# Patient Record
(demographics unavailable — no encounter records)

---

## 2024-10-30 NOTE — PHYSICAL EXAM
[de-identified] : Left shoulder exam   Inspection: There is no swelling or ecchymosis. Skin: There is a soft nontender freely mobile structure approximately 3 x 3 cm at the inferior margin of the scapula Tenderness: There is diffuse tenderness about the shoulder ROM: Passive forward elevation 140 degrees abduction 80 degrees he is able to maintain his arm in abduction.  External rotation 40 degrees  Neuro: AIN, PIN, Ulnar nerve motor intact Sensation: Intact to light touch in radial, median, ulnar, and axillary nerve distributions Vasc: 2+ radial pulse

## 2024-12-04 NOTE — HISTORY OF PRESENT ILLNESS
[de-identified] : 49-year-old male left shoulder pain 2 days.  He sustained an accident at work where he dislocated his shoulder.  He was seen in outside emergency room where he was closed reduced.  He has been in a sling and shoulder immobilizer since that time he reports pain in his shoulder.  He denies any prior shoulder injury.  He denies numbness tingling He presents today in a sling his pain is improving he denies numbness tingling.  He reports that the pain is improving he is working with physical therapy He is not out of his sling.  He is working physical therapy reports some soreness in his shoulder although this is improving. He is continue to work with physical therapy reports some soreness in his lateral shoulder reports some stiffness his pain is improving Pain posterior aspect of the shoulder around the scapular region.  This is new since last visit.  No new injuries  He is now 6 months post injury.  He does feel like he is improving although feels like he is improving slower than expected.  He complains of stiffness in his shoulder he feels like his left shoulder is weaker than the other side he does not feel like this is improved in the last month

## 2024-12-04 NOTE — DISCUSSION/SUMMARY
[de-identified] : 49-year-old male 6-month status post left shoulder greater tuberosity fracture.  The fracture is healed on x-ray however he is significant limitation in motion as well as strength.  He is very weak with resisted abduction.  It would be unusual to have a simultaneous rotator cuff injury with a greater tuberosity fracture.  The fracture did appear minimally displaced on radiographs but may be more posteriorly displaced than initially appreciated?  Will obtain a CT arthrogram at this time to assess fracture union as well as fracture position as well as status of the soft tissues including rotator cuff to further discuss additional options to try to help.  He will continue his physical therapy focusing on range of motion and strengthening.  He will follow-up after CT scan is obtained

## 2024-12-04 NOTE — PHYSICAL EXAM
[de-identified] : Left shoulder exam  	Inspection: No swelling, ecchymosis or gross deformity. 	Skin: No masses, No lesions 	Neck: Negative Spurlings, full ROM without pain 	Tenderness: No bicipital tenderness, no tenderness to the greater tuberosity/RTC insertion, no anterior shoulder/lesser tuberosity tenderness. No tenderness SC joint, clavicle, AC joint. 	ROM: Active forward elevation 1 to 20 degrees passive to 160 degrees abduction to 80 degrees external rotation 20 degrees internal rotation to L5 	Impingement tests: Negative Olivier, Negative Neer, no painful arc 	AC Joint: no pain with cross arm testing 	Biceps: Speed positive 	Strength: 4/5 abduction, 5/5 external rotation, and internal rotation 	Neuro: AIN, PIN, Ulnar nerve motor intact 	Sensation: Intact to light touch in radial, median, ulnar, and axillary nerve distributions 	Vasc: 2+ radial pulse  [de-identified] : The following radiographs were ordered and read by me during this patients visit. I reviewed each radiograph in detail with the patient and discussed the findings as highlighted below.  2 views left shoulder obtained today there is a healed greater tuberosity fracture and slight posterior positioning the glenohumeral joint is well-maintained

## 2025-02-25 NOTE — DISCUSSION/SUMMARY
[de-identified] : Healed greater tuberosity fracture with slight posterior displacement.  He has a corresponding slight decrease in external rotation.  At this point he is cleared for activities as he feels comfortable and is limited by his level of pain and stiffness.  He wishes to continue physical therapy.  He will follow-up for annual checkup.  All questions answered

## 2025-02-25 NOTE — PHYSICAL EXAM
[de-identified] : Left shoulder exam  	Inspection: No swelling, ecchymosis or gross deformity. 	Skin: No masses, No lesions 	Neck: Negative Spurlings, full ROM without pain 	Tenderness: No bicipital tenderness, no tenderness to the greater tuberosity/RTC insertion, no anterior shoulder/lesser tuberosity tenderness. No tenderness SC joint, clavicle, AC joint. 	ROM: Active forward elevation 150 degrees.  External rotation 20 IR T12 	Impingement tests: Negative Olivier, Negative Neer, no painful arc 	AC Joint: no pain with cross arm testing 	Biceps: Speed positive 	Strength: 4/5 abduction, 5/5 external rotation, and internal rotation 	Neuro: AIN, PIN, Ulnar nerve motor intact 	Sensation: Intact to light touch in radial, median, ulnar, and axillary nerve distributions 	Vasc: 2+ radial pulse  [de-identified] : CT scan reviewed.  CT arthrogram healed greater tuberosity fracture with slight posterior inferior displacement.  Minimal rotator cuff tendinopathy

## 2025-02-25 NOTE — HISTORY OF PRESENT ILLNESS
[de-identified] : 49-year-old male left shoulder pain 2 days.  He sustained an accident at work where he dislocated his shoulder.  He was seen in outside emergency room where he was closed reduced.  He has been in a sling and shoulder immobilizer since that time he reports pain in his shoulder.  He denies any prior shoulder injury.  He denies numbness tingling He presents today in a sling his pain is improving he denies numbness tingling.  He reports that the pain is improving he is working with physical therapy He is not out of his sling.  He is working physical therapy reports some soreness in his shoulder although this is improving. He is continue to work with physical therapy reports some soreness in his lateral shoulder reports some stiffness his pain is improving Pain posterior aspect of the shoulder around the scapular region.  This is new since last visit.  No new injuries  He is now 6 months post injury.  He does feel like he is improving although feels like he is improving slower than expected.  He complains of stiffness in his shoulder he feels like his left shoulder is weaker than the other side he does not feel like this is improved in the last month He is now 8 months post injury he does report some improvement in his range of motion he reports limitations with external rotation

## 2025-05-09 NOTE — DISCUSSION/SUMMARY
[de-identified] : Healed greater tuberosity fracture with slight posterior displacement.  He has a corresponding slight decrease in external rotation.  He has reached maximal medical improvement.  There is likely some level of permanent in terms of his weakness and motion loss.  This qualifies for approximate 25% permanent partial disability in terms of his left shoulder he may perform activities as tolerated understanding there are some limitation of range of motion and strength that is permanent.  He may follow-up as needed

## 2025-05-09 NOTE — PHYSICAL EXAM
[de-identified] : Left shoulder exam  	Inspection: No swelling, ecchymosis or gross deformity. 	Skin: No masses, No lesions 	Neck: Negative Spurlings, full ROM without pain 	Tenderness: No bicipital tenderness, no tenderness to the greater tuberosity/RTC insertion, no anterior shoulder/lesser tuberosity tenderness. No tenderness SC joint, clavicle, AC joint. 	ROM: Active forward elevation 150 degrees.  External rotation 20 IR T12 compared 160 degrees of forward elevation and 60 degrees of external rotation on the right side 	Impingement tests: Negative Olivier, Negative Neer, no painful arc 	AC Joint: no pain with cross arm testing 	Biceps: Speed positive 	Strength: 4/5 abduction, 5/5 external rotation, and internal rotation 	Neuro: AIN, PIN, Ulnar nerve motor intact 	Sensation: Intact to light touch in radial, median, ulnar, and axillary nerve distributions 	Vasc: 2+ radial pulse  [de-identified] : The following radiographs were ordered and read by me during this patients visit. I reviewed each radiograph in detail with the patient and discussed the findings as highlighted below.  6 views left shoulder were obtained today healed greater tuberosity fracture with slight posterior inferior displacement joint spaces well maintained normal alignment

## 2025-05-09 NOTE — HISTORY OF PRESENT ILLNESS
[de-identified] : 50-year-old male left shoulder pain 2 days.  He sustained an accident at work where he dislocated his shoulder.  He was seen in outside emergency room where he was closed reduced.  He has been in a sling and shoulder immobilizer since that time he reports pain in his shoulder.  He denies any prior shoulder injury.  He denies numbness tingling He presents today in a sling his pain is improving he denies numbness tingling.  He reports that the pain is improving he is working with physical therapy He is not out of his sling.  He is working physical therapy reports some soreness in his shoulder although this is improving. He is continue to work with physical therapy reports some soreness in his lateral shoulder reports some stiffness his pain is improving Pain posterior aspect of the shoulder around the scapular region.  This is new since last visit.  No new injuries  He is now 6 months post injury.  He does feel like he is improving although feels like he is improving slower than expected.  He complains of stiffness in his shoulder he feels like his left shoulder is weaker than the other side he does not feel like this is improved in the last month He is now 8 months post injury he does report some improvement in his range of motion he reports limitations with external rotation